# Patient Record
Sex: MALE | Race: WHITE | NOT HISPANIC OR LATINO | Employment: UNEMPLOYED | URBAN - METROPOLITAN AREA
[De-identification: names, ages, dates, MRNs, and addresses within clinical notes are randomized per-mention and may not be internally consistent; named-entity substitution may affect disease eponyms.]

---

## 2017-05-05 ENCOUNTER — TELEPHONE (OUTPATIENT)
Dept: FAMILY MEDICINE | Facility: CLINIC | Age: 67
End: 2017-05-05

## 2017-05-05 NOTE — TELEPHONE ENCOUNTER
Pharmacy called regarding medication refill    Appears that patient is not receiving care in the [facility] system (is INR patient)    Advised to contact Dr. Brown's office with [facility] in [place] for refills.     ANGEL Ott

## 2020-01-06 ENCOUNTER — HOSPITAL ENCOUNTER (OUTPATIENT)
Facility: CLINIC | Age: 70
Discharge: HOME | End: 2020-01-06
Attending: COLON & RECTAL SURGERY | Admitting: COLON & RECTAL SURGERY
Payer: COMMERCIAL

## 2020-01-06 VITALS
WEIGHT: 230 LBS | HEART RATE: 60 BPM | BODY MASS INDEX: 28.6 KG/M2 | HEIGHT: 75 IN | OXYGEN SATURATION: 97 % | DIASTOLIC BLOOD PRESSURE: 79 MMHG | RESPIRATION RATE: 18 BRPM | SYSTOLIC BLOOD PRESSURE: 123 MMHG

## 2020-01-06 LAB
FLEXIBLE SIGMOIDOSCOPY: NORMAL
INR PPP: 1.11 (ref 0.86–1.14)

## 2020-01-06 PROCEDURE — 45334 SIGMOIDOSCOPY FOR BLEEDING: CPT | Performed by: COLON & RECTAL SURGERY

## 2020-01-06 PROCEDURE — 85610 PROTHROMBIN TIME: CPT | Performed by: COLON & RECTAL SURGERY

## 2020-01-06 PROCEDURE — 45338 SIGMOIDOSCOPY W/TUMR REMOVE: CPT | Performed by: COLON & RECTAL SURGERY

## 2020-01-06 PROCEDURE — 88305 TISSUE EXAM BY PATHOLOGIST: CPT | Performed by: COLON & RECTAL SURGERY

## 2020-01-06 PROCEDURE — 36415 COLL VENOUS BLD VENIPUNCTURE: CPT | Performed by: COLON & RECTAL SURGERY

## 2020-01-06 PROCEDURE — 88305 TISSUE EXAM BY PATHOLOGIST: CPT | Mod: 26 | Performed by: COLON & RECTAL SURGERY

## 2020-01-06 RX ORDER — ONDANSETRON 2 MG/ML
4 INJECTION INTRAMUSCULAR; INTRAVENOUS
Status: DISCONTINUED | OUTPATIENT
Start: 2020-01-06 | End: 2020-01-06 | Stop reason: HOSPADM

## 2020-01-06 RX ORDER — ONDANSETRON 4 MG/1
4 TABLET, ORALLY DISINTEGRATING ORAL EVERY 6 HOURS PRN
Status: DISCONTINUED | OUTPATIENT
Start: 2020-01-06 | End: 2020-01-06 | Stop reason: HOSPADM

## 2020-01-06 RX ORDER — NALOXONE HYDROCHLORIDE 0.4 MG/ML
.1-.4 INJECTION, SOLUTION INTRAMUSCULAR; INTRAVENOUS; SUBCUTANEOUS
Status: DISCONTINUED | OUTPATIENT
Start: 2020-01-06 | End: 2020-01-06 | Stop reason: HOSPADM

## 2020-01-06 RX ORDER — LIDOCAINE 40 MG/G
CREAM TOPICAL
Status: DISCONTINUED | OUTPATIENT
Start: 2020-01-06 | End: 2020-01-06 | Stop reason: HOSPADM

## 2020-01-06 RX ORDER — ONDANSETRON 2 MG/ML
4 INJECTION INTRAMUSCULAR; INTRAVENOUS EVERY 6 HOURS PRN
Status: DISCONTINUED | OUTPATIENT
Start: 2020-01-06 | End: 2020-01-06 | Stop reason: HOSPADM

## 2020-01-06 RX ORDER — FLUMAZENIL 0.1 MG/ML
0.2 INJECTION, SOLUTION INTRAVENOUS
Status: DISCONTINUED | OUTPATIENT
Start: 2020-01-06 | End: 2020-01-06 | Stop reason: HOSPADM

## 2020-01-06 ASSESSMENT — MIFFLIN-ST. JEOR: SCORE: 1890.96

## 2020-01-06 NOTE — H&P
Pre-Endoscopy History and Physical     Shan Wood MRN# [id number]   YOB: 1950 Age: 69 year old     Date of Procedure: 2020  Primary care provider: Chase Brown  Type of Endoscopy: colonoscopy  Reason for Procedure: anemia, history of subtotal colectomy for bleeding   Type of Anesthesia Anticipated: Moderate Sedation    HPI:    Shan is a 69 year old male who will be undergoing the above procedure.      A history and physical has been performed. The patient's medications and allergies have been reviewed. The risks and benefits of the procedure and the sedation options and risks were discussed with the patient.  All questions were answered and informed consent was obtained.      He denies a personal or family history of anesthesia complications or bleeding disorders.     No Known Allergies     Prior to Admission Medications   Prescriptions Last Dose Informant Patient Reported? Taking?   ZYRTEC 10 MG OR TABS 2020 at Unknown time  No Yes   Si TABLET DAILY   azithromycin (ZITHROMAX) 250 MG tablet Unknown at Unknown time  No No   Sig: Two tablets first day, then one tablet daily for four days.   citalopram (CELEXA) 40 MG tablet 2020 at Unknown time  No Yes   Sig: Take 1 tablet (40 mg) by mouth daily   clonazePAM (KLONOPIN) 1 MG tablet Past Week at Unknown time  No Yes   Sig: Take 0.5 tablets (0.5 mg) by mouth nightly as needed for anxiety Tabs 1 at bedtime for RLS   ferrous sulfate (SLO-FE) 142 (45 FE) MG TBCR Past Month at Unknown time  Yes Yes   Sig: Take 142 mg by mouth daily   lisinopril (PRINIVIL,ZESTRIL) 5 MG tablet 2020 at Unknown time  No Yes   Sig: Take 1 tablet (5 mg) by mouth daily   pravastatin (PRAVACHOL) 40 MG tablet 2020 at Unknown time  No Yes   Sig: Take 1 tablet (40 mg) by mouth daily   propranolol (INDERAL LA) 60 MG capsule 2020 at Unknown time  No Yes   Sig: Take 1 capsule (60 mg) by mouth daily   propranolol (INDERAL) 20 MG tablet 2020 at Unknown  time  No Yes   Si tablet bid - tid prn anxiety flares.   warfarin (COUMADIN) 10 MG tablet 2020  Yes Yes   Sig: Take by mouth daily As directed .    warfarin (COUMADIN) 5 MG tablet 2020  No Yes   Sig: TAKE TWO TABLETS(10MG) BY MOUTH DAILY      Facility-Administered Medications: None       Patient Active Problem List   Diagnosis     Essential hypertension     Depressive disorder, not elsewhere classified     Hemorrhage of gastrointestinal tract     Intestinal bypass or anastomosis status     Anxiety     Chronic rhinitis     Anemia     Back pain     DVT (deep venous thrombosis) (H)     Pulmonary embolism (H)     CARDIOVASCULAR SCREENING; LDL GOAL LESS THAN 130     Health care home, active care coordination     Subdural hemorrhage (H)     Hyperlipidemia LDL goal <130     Thrombus     Mild major depression (H)     Arthritis of knee, left     Varicose veins of legs     Iron deficiency anemia        Past Medical History:   Diagnosis Date     Blood clotting disorder (H) DVT     Depressed      DVT (deep venous thrombosis) (H)      GI bleed     chronic gi bleed     History of blood transfusion      Hyperlipidaemia      Hyperlipidemia      Hypertension      Iatrogenic pulmonary embolism and infarction (H)      Intestinal bypass or anastomosis status      Nonspecific (abnormal) findings on radiological and other examination of gastrointestinal tract     AV malformation     Other kyphoscoliosis and scoliosis      Other specified anemias      PONV (postoperative nausea and vomiting)      Subdural haematoma         Past Surgical History:   Procedure Laterality Date     ABDOMEN SURGERY  colon    resection     ARTHROPLASTY KNEE  2013    Procedure: ARTHROPLASTY KNEE;  LEFT TOTAL KNEE ARTHROPLASTY;  Surgeon: Johnie Wilson MD;  Location: [facility] OR     ARTHROSCOPY KNEE RT/LT      right     CATARACT IOL, RT/LT           COLONOSCOPY       left knee replaced       SURGICAL HISTORY OF -       ORIF LEFT  "ANKLE     SURGICAL HISTORY OF -   2006    colon resection, SUBTOTAL     SURGICAL HISTORY OF -       [facility] treatment of Osgood-Schlatter's disease       Social History     Tobacco Use     Smoking status: Never Smoker     Smokeless tobacco: Never Used   Substance Use Topics     Alcohol use: Not Currently     Alcohol/week: 3.3 standard drinks       Family History   Problem Relation Age of Onset     Hypertension Mother      C.A.D. Mother      Cancer Mother      Hypertension Father      C.A.D. Father      Cerebrovascular Disease Father      Diabetes Brother      Colon Cancer No family hx of        REVIEW OF SYSTEMS:     5 point ROS negative except as noted above in HPI, including Gen., Resp., CV, GI &  system review.      PHYSICAL EXAM:   Ht 1.892 m (6' 2.5\")   Wt 104.3 kg (230 lb)   BMI 29.14 kg/m   Estimated body mass index is 29.14 kg/m  as calculated from the following:    Height as of this encounter: 1.892 m (6' 2.5\").    Weight as of this encounter: 104.3 kg (230 lb).   GENERAL APPEARANCE: healthy and alert  MENTAL STATUS: alert  AIRWAY EXAM: Mallampatti Class II (visualization of the soft palate, fauces, and uvula)  RESP: lungs clear to auscultation - no rales, rhonchi or wheezes  CV: regular rates and rhythm      DIAGNOSTICS:    Not indicated      IMPRESSION   ASA Class 2 - Mild systemic disease        PLAN:       Plan for colonoscopy. We discussed the risks, benefits and alternatives and the patient wished to proceed.    The above has been forwarded to the consulting provider.      Signed Electronically by: Christi Simon MD, MD  January 6, 2020    "

## 2020-01-07 LAB — COPATH REPORT: NORMAL

## 2020-02-23 ENCOUNTER — HEALTH MAINTENANCE LETTER (OUTPATIENT)
Age: 70
End: 2020-02-23

## 2020-11-29 ENCOUNTER — HEALTH MAINTENANCE LETTER (OUTPATIENT)
Age: 70
End: 2020-11-29

## 2021-04-10 ENCOUNTER — HEALTH MAINTENANCE LETTER (OUTPATIENT)
Age: 71
End: 2021-04-10

## 2021-09-25 ENCOUNTER — HEALTH MAINTENANCE LETTER (OUTPATIENT)
Age: 71
End: 2021-09-25

## 2022-05-07 ENCOUNTER — HEALTH MAINTENANCE LETTER (OUTPATIENT)
Age: 72
End: 2022-05-07

## 2022-12-26 ENCOUNTER — HEALTH MAINTENANCE LETTER (OUTPATIENT)
Age: 72
End: 2022-12-26

## 2023-03-07 ENCOUNTER — TELEPHONE (OUTPATIENT)
Dept: GASTROENTEROLOGY | Facility: CLINIC | Age: 73
End: 2023-03-07
Payer: COMMERCIAL

## 2023-03-07 NOTE — TELEPHONE ENCOUNTER
[facility] / [facility] Coordinator Name: Nav  Return call number: [phone]      Insurance: [employer]  We do not accept [employer] patients.    SCHEDULED PROVIDER:  YOMI PROVIDERS: HAILEE  No orders needed if scheduled with their provider.    DATE: 4/24   TIME 11:15  LOCATION: [facility]  PROCEDURE: Lower Endoscopy [Colonoscopy]   DIAGNOSIS : History of Polyps   SEDATION: CS      Details/Prep Information -  Yes. [facility] will be sending details and prep information to patient.  Confirm who is responsible for sending prep details.    ADDITIONAL INFORMATION:

## 2023-04-18 RX ORDER — RIVAROXABAN 10 MG/1
10 TABLET, FILM COATED ORAL DAILY
COMMUNITY
Start: 2023-01-23

## 2023-04-24 ENCOUNTER — HOSPITAL ENCOUNTER (OUTPATIENT)
Facility: CLINIC | Age: 73
Discharge: HOME | End: 2023-04-24
Attending: COLON & RECTAL SURGERY | Admitting: COLON & RECTAL SURGERY
Payer: COMMERCIAL

## 2023-04-24 VITALS
TEMPERATURE: 97.4 F | WEIGHT: 233 LBS | HEART RATE: 65 BPM | DIASTOLIC BLOOD PRESSURE: 74 MMHG | BODY MASS INDEX: 29.9 KG/M2 | SYSTOLIC BLOOD PRESSURE: 122 MMHG | HEIGHT: 74 IN | OXYGEN SATURATION: 93 % | RESPIRATION RATE: 14 BRPM

## 2023-04-24 LAB — COLONOSCOPY: NORMAL

## 2023-04-24 PROCEDURE — G0500 MOD SEDAT ENDO SERVICE >5YRS: HCPCS | Mod: PT | Performed by: COLON & RECTAL SURGERY

## 2023-04-24 PROCEDURE — 45385 COLONOSCOPY W/LESION REMOVAL: CPT | Mod: PT | Performed by: COLON & RECTAL SURGERY

## 2023-04-24 PROCEDURE — 88305 TISSUE EXAM BY PATHOLOGIST: CPT | Mod: TC | Performed by: COLON & RECTAL SURGERY

## 2023-04-24 PROCEDURE — 250N000011 HC RX IP 250 OP 636: Performed by: COLON & RECTAL SURGERY

## 2023-04-24 RX ORDER — LIDOCAINE 40 MG/G
CREAM TOPICAL
Status: DISCONTINUED | OUTPATIENT
Start: 2023-04-24 | End: 2023-04-24 | Stop reason: HOSPADM

## 2023-04-24 RX ORDER — SIMETHICONE 40MG/0.6ML
133 SUSPENSION, DROPS(FINAL DOSAGE FORM)(ML) ORAL
Status: DISCONTINUED | OUTPATIENT
Start: 2023-04-24 | End: 2023-04-24 | Stop reason: HOSPADM

## 2023-04-24 RX ORDER — ONDANSETRON 2 MG/ML
4 INJECTION INTRAMUSCULAR; INTRAVENOUS EVERY 6 HOURS PRN
Status: DISCONTINUED | OUTPATIENT
Start: 2023-04-24 | End: 2023-04-24 | Stop reason: HOSPADM

## 2023-04-24 RX ORDER — NALOXONE HYDROCHLORIDE 0.4 MG/ML
0.4 INJECTION, SOLUTION INTRAMUSCULAR; INTRAVENOUS; SUBCUTANEOUS
Status: DISCONTINUED | OUTPATIENT
Start: 2023-04-24 | End: 2023-04-24 | Stop reason: HOSPADM

## 2023-04-24 RX ORDER — PROCHLORPERAZINE MALEATE 5 MG
5 TABLET ORAL EVERY 6 HOURS PRN
Status: DISCONTINUED | OUTPATIENT
Start: 2023-04-24 | End: 2023-04-24 | Stop reason: HOSPADM

## 2023-04-24 RX ORDER — FENTANYL CITRATE 50 UG/ML
50-100 INJECTION, SOLUTION INTRAMUSCULAR; INTRAVENOUS EVERY 5 MIN PRN
Status: DISCONTINUED | OUTPATIENT
Start: 2023-04-24 | End: 2023-04-24 | Stop reason: HOSPADM

## 2023-04-24 RX ORDER — ONDANSETRON 4 MG/1
4 TABLET, ORALLY DISINTEGRATING ORAL EVERY 6 HOURS PRN
Status: DISCONTINUED | OUTPATIENT
Start: 2023-04-24 | End: 2023-04-24 | Stop reason: HOSPADM

## 2023-04-24 RX ORDER — NALOXONE HYDROCHLORIDE 0.4 MG/ML
0.2 INJECTION, SOLUTION INTRAMUSCULAR; INTRAVENOUS; SUBCUTANEOUS
Status: DISCONTINUED | OUTPATIENT
Start: 2023-04-24 | End: 2023-04-24 | Stop reason: HOSPADM

## 2023-04-24 RX ORDER — FLUMAZENIL 0.1 MG/ML
0.2 INJECTION, SOLUTION INTRAVENOUS
Status: DISCONTINUED | OUTPATIENT
Start: 2023-04-24 | End: 2023-04-24 | Stop reason: HOSPADM

## 2023-04-24 RX ORDER — ONDANSETRON 2 MG/ML
4 INJECTION INTRAMUSCULAR; INTRAVENOUS
Status: DISCONTINUED | OUTPATIENT
Start: 2023-04-24 | End: 2023-04-24 | Stop reason: HOSPADM

## 2023-04-24 RX ORDER — ATROPINE SULFATE 0.1 MG/ML
1 INJECTION INTRAVENOUS
Status: DISCONTINUED | OUTPATIENT
Start: 2023-04-24 | End: 2023-04-24 | Stop reason: HOSPADM

## 2023-04-24 RX ORDER — EPINEPHRINE 1 MG/ML
0.1 INJECTION, SOLUTION INTRAMUSCULAR; SUBCUTANEOUS
Status: DISCONTINUED | OUTPATIENT
Start: 2023-04-24 | End: 2023-04-24 | Stop reason: HOSPADM

## 2023-04-24 RX ORDER — DIPHENHYDRAMINE HYDROCHLORIDE 50 MG/ML
25-50 INJECTION INTRAMUSCULAR; INTRAVENOUS
Status: DISCONTINUED | OUTPATIENT
Start: 2023-04-24 | End: 2023-04-24 | Stop reason: HOSPADM

## 2023-04-24 RX ADMIN — MIDAZOLAM 2 MG: 1 INJECTION INTRAMUSCULAR; INTRAVENOUS at 09:44

## 2023-04-24 RX ADMIN — FENTANYL CITRATE 100 MCG: 50 INJECTION, SOLUTION INTRAMUSCULAR; INTRAVENOUS at 09:44

## 2023-04-24 ASSESSMENT — ACTIVITIES OF DAILY LIVING (ADL): ADLS_ACUITY_SCORE: 35

## 2023-04-24 NOTE — H&P
Pre-Endoscopy History and Physical     Shan Wood MRN# [id number]   YOB: 1950 Age: 72 year old     Date of Procedure: 2023  Primary care provider: Chase Brown  Type of Endoscopy: colonoscopy  Reason for Procedure: subtotal colectomy for bleeding , prior polyps  Type of Anesthesia Anticipated: Moderate Sedation    HPI:    Shan is a 72 year old male who will be undergoing the above procedure.      A history and physical has been performed. The patient's medications and allergies have been reviewed. The risks and benefits of the procedure and the sedation options and risks were discussed with the patient.  All questions were answered and informed consent was obtained.      He denies a personal or family history of anesthesia complications or bleeding disorders.     No Known Allergies     Prior to Admission Medications   Prescriptions Last Dose Informant Patient Reported? Taking?   XARELTO ANTICOAGULANT 10 MG TABS tablet   Yes Yes   Sig: Take 10 mg by mouth daily   ZYRTEC 10 MG OR TABS   No No   Si TABLET DAILY   citalopram (CELEXA) 40 MG tablet   No No   Sig: Take 1 tablet (40 mg) by mouth daily   clonazePAM (KLONOPIN) 1 MG tablet   No No   Sig: Take 0.5 tablets (0.5 mg) by mouth nightly as needed for anxiety Tabs 1 at bedtime for RLS   ferrous sulfate (SLO-FE) 142 (45 FE) MG TBCR   Yes No   Sig: Take 142 mg by mouth daily   lisinopril (PRINIVIL,ZESTRIL) 5 MG tablet   No No   Sig: Take 1 tablet (5 mg) by mouth daily   pravastatin (PRAVACHOL) 40 MG tablet   No No   Sig: Take 1 tablet (40 mg) by mouth daily   propranolol (INDERAL LA) 60 MG capsule   No No   Sig: Take 1 capsule (60 mg) by mouth daily   propranolol (INDERAL) 20 MG tablet   No No   Si tablet bid - tid prn anxiety flares.      Facility-Administered Medications: None       Patient Active Problem List   Diagnosis     Essential hypertension     Depressive disorder, not elsewhere classified     Hemorrhage of  gastrointestinal tract     Intestinal bypass or anastomosis status     Anxiety     Chronic rhinitis     Anemia     Back pain     DVT (deep venous thrombosis) (H)     Pulmonary embolism (H)     CARDIOVASCULAR SCREENING; LDL GOAL LESS THAN 130     Health care home, active care coordination     Subdural hemorrhage (H)     Hyperlipidemia LDL goal <130     Thrombus     Mild major depression (H)     Arthritis of knee, left     Varicose veins of legs     Iron deficiency anemia        Past Medical History:   Diagnosis Date     Blood clotting disorder (H) DVT     Depressed      DVT (deep venous thrombosis) (H)      GI bleed     chronic gi bleed     History of blood transfusion      Hyperlipidaemia      Hyperlipidemia      Hypertension      Iatrogenic pulmonary embolism and infarction (H)      Intestinal bypass or anastomosis status      Nonspecific (abnormal) findings on radiological and other examination of gastrointestinal tract     AV malformation     Other kyphoscoliosis and scoliosis      Other specified anemias      PONV (postoperative nausea and vomiting)      Subdural haematoma 01/01/2012        Past Surgical History:   Procedure Laterality Date     ABDOMEN SURGERY  colon    resection     ARTHROPLASTY KNEE  6/17/2013    Procedure: ARTHROPLASTY KNEE;  LEFT TOTAL KNEE ARTHROPLASTY;  Surgeon: Johnie Wilson MD;  Location: [facility] OR     ARTHROSCOPY KNEE RT/LT      right     CATARACT IOL, RT/LT      2006     COLONOSCOPY  2006     left knee replaced       SURGICAL HISTORY OF -       ORIF LEFT ANKLE     SURGICAL HISTORY OF -   2006    colon resection, SUBTOTAL     SURGICAL HISTORY OF -       [facility] treatment of Osgood-Schlatter's disease       Social History     Tobacco Use     Smoking status: Never     Smokeless tobacco: Never   Vaping Use     Vaping status: Not on file   Substance Use Topics     Alcohol use: Not Currently     Alcohol/week: 3.3 standard drinks of alcohol       Family History   Problem Relation Age  "of Onset     Hypertension Mother      C.A.D. Mother      Cancer Mother      Hypertension Father      C.A.D. Father      Cerebrovascular Disease Father      Diabetes Brother      Colon Cancer No family hx of        REVIEW OF SYSTEMS:     5 point ROS negative except as noted above in HPI, including Gen., Resp., CV, GI &  system review.      PHYSICAL EXAM:   There were no vitals taken for this visit. Estimated body mass index is 29.14 kg/m  as calculated from the following:    Height as of 1/6/20: 1.892 m (6' 2.5\").    Weight as of 1/6/20: 104.3 kg (230 lb).   GENERAL APPEARANCE: healthy and alert  MENTAL STATUS: alert  AIRWAY EXAM: Mallampatti Class II (visualization of the soft palate, fauces, and uvula)  RESP: lungs clear to auscultation - no rales, rhonchi or wheezes  CV: regular rates and rhythm      DIAGNOSTICS:    Not indicated      IMPRESSION   ASA Class 2 - Mild systemic disease        PLAN:       Plan for colonoscopy. We discussed the risks, benefits and alternatives and the patient wished to proceed.    The above has been forwarded to the consulting provider.      Signed Electronically by: Christi Simon MD, MD  April 24, 2023    "

## 2023-04-26 LAB
PATH REPORT.COMMENTS IMP SPEC: NORMAL
PATH REPORT.COMMENTS IMP SPEC: NORMAL
PATH REPORT.FINAL DX SPEC: NORMAL
PATH REPORT.GROSS SPEC: NORMAL
PATH REPORT.MICROSCOPIC SPEC OTHER STN: NORMAL
PATH REPORT.RELEVANT HX SPEC: NORMAL
PHOTO IMAGE: NORMAL

## 2023-04-26 PROCEDURE — 88305 TISSUE EXAM BY PATHOLOGIST: CPT | Mod: 26 | Performed by: PATHOLOGY

## 2023-06-02 ENCOUNTER — HEALTH MAINTENANCE LETTER (OUTPATIENT)
Age: 73
End: 2023-06-02

## 2024-06-23 ENCOUNTER — HEALTH MAINTENANCE LETTER (OUTPATIENT)
Age: 74
End: 2024-06-23

## 2024-08-20 ENCOUNTER — TRANSFERRED RECORDS (OUTPATIENT)
Dept: HEALTH INFORMATION MANAGEMENT | Facility: CLINIC | Age: 74
End: 2024-08-20
Payer: COMMERCIAL

## 2025-03-05 ENCOUNTER — TELEPHONE (OUTPATIENT)
Dept: OTHER | Facility: CLINIC | Age: 75
End: 2025-03-05
Payer: COMMERCIAL

## 2025-03-05 NOTE — TELEPHONE ENCOUNTER
Returned patient's phone call.  Discussed with patient that his filter has been in for many years.  He had a conversation with Dr. Chan back in 2015 and it was discussed that the filter needs to be left in.  Discussed again with the patient, it would cause more harm to attempt to remove the filter.   He verbalizes understanding.  Bernice Calvert RN  IR nurse clinician  [phone]

## 2025-03-05 NOTE — TELEPHONE ENCOUNTER
[facility]    Who is the name of the provider?:  NONE   What is the location you see this provider at/preferred location?: [place]  Person calling / Facility: Shan Wood  Phone number:  [phone] (mobile)   Nurse call back needed:  Yes or route to scheduling     Reason for call:  Patient is calling to follow up on a filter he has surgically placed years ago. Patient is wondering if he should be seen to check on the device?    Pharmacy location:  n/a  Outside Imaging: n/a   Can we leave a detailed message on this number?  YES     3/5/2025, 2:56 PM

## 2025-07-12 ENCOUNTER — HEALTH MAINTENANCE LETTER (OUTPATIENT)
Age: 75
End: 2025-07-12

## 2025-07-17 ENCOUNTER — TRANSFERRED RECORDS (OUTPATIENT)
Dept: ADMINISTRATIVE | Facility: CLINIC | Age: 75
End: 2025-07-17
Payer: COMMERCIAL

## 2025-07-24 NOTE — PROGRESS NOTES
2025        Shan Wood   [address] [place] [address]      Shan Wood,  :  1950    I'm writing to let you know about the tests that were taken recently.   Thank you for allowing [facility] the opportunity to take part in your healthcare.  At [facility] we strive to provide each patient with the finest gastroenterology care available.  We hope your experience was pleasant and informative.    save for Dr. Torrez.      Thank you.    Electronically signed by:  POLI Carty MD 2025 08:24 PM  Document generated by:  POLI Carty MD  2025  If your provider ordered multiple tests; the results may not become available at the same time.  If multiple test results are received within 14 days of one another, you may receive a duplicate.

## 2025-08-13 ENCOUNTER — TRANSFERRED RECORDS (OUTPATIENT)
Dept: ADMINISTRATIVE | Facility: CLINIC | Age: 75
End: 2025-08-13
Payer: COMMERCIAL

## 2025-08-15 ENCOUNTER — TRANSFERRED RECORDS (OUTPATIENT)
Dept: ADMINISTRATIVE | Facility: CLINIC | Age: 75
End: 2025-08-15
Payer: COMMERCIAL

## (undated) DEVICE — INFLATION DEVICE BIG 60 ENDO-AN6012

## (undated) DEVICE — ENDO TRAP POLYP E-TRAP 00711099

## (undated) DEVICE — ENDO TRAP POLYP QUICK CATCH 710201

## (undated) DEVICE — ESU GROUND PAD ADULT W/CORD E7507

## (undated) DEVICE — ENDO SNARE POLYPECTOMY OVAL 15MM LOOP SD-240U-15

## (undated) DEVICE — CLIP HEMOCLIP ENDOSCOPIC INSTINCT 2.8X230CM INSC-7-230-SS

## (undated) DEVICE — KIT ENDO TURNOVER/PROCEDURE W/CLEAN A SCOPE LINERS 103888

## (undated) RX ORDER — FENTANYL CITRATE 50 UG/ML
INJECTION, SOLUTION INTRAMUSCULAR; INTRAVENOUS
Status: DISPENSED
Start: 2023-04-24

## (undated) RX ORDER — FENTANYL CITRATE 50 UG/ML
INJECTION, SOLUTION INTRAMUSCULAR; INTRAVENOUS
Status: DISPENSED
Start: 2020-01-06